# Patient Record
Sex: FEMALE | Race: BLACK OR AFRICAN AMERICAN | NOT HISPANIC OR LATINO | Employment: FULL TIME | URBAN - METROPOLITAN AREA
[De-identification: names, ages, dates, MRNs, and addresses within clinical notes are randomized per-mention and may not be internally consistent; named-entity substitution may affect disease eponyms.]

---

## 2017-09-20 ENCOUNTER — OFFICE VISIT (OUTPATIENT)
Dept: URGENT CARE | Facility: CLINIC | Age: 42
End: 2017-09-20
Payer: COMMERCIAL

## 2017-09-20 VITALS
HEART RATE: 80 BPM | DIASTOLIC BLOOD PRESSURE: 71 MMHG | OXYGEN SATURATION: 100 % | HEIGHT: 72 IN | BODY MASS INDEX: 29.8 KG/M2 | TEMPERATURE: 98 F | SYSTOLIC BLOOD PRESSURE: 111 MMHG | WEIGHT: 220 LBS | RESPIRATION RATE: 20 BRPM

## 2017-09-20 DIAGNOSIS — R04.2 HEMOPTYSIS, UNSPECIFIED: Primary | ICD-10-CM

## 2017-09-20 DIAGNOSIS — J01.00 ACUTE MAXILLARY SINUSITIS, RECURRENCE NOT SPECIFIED: ICD-10-CM

## 2017-09-20 PROCEDURE — 96372 THER/PROPH/DIAG INJ SC/IM: CPT | Mod: S$GLB,,, | Performed by: EMERGENCY MEDICINE

## 2017-09-20 PROCEDURE — 3008F BODY MASS INDEX DOCD: CPT | Mod: S$GLB,,, | Performed by: EMERGENCY MEDICINE

## 2017-09-20 PROCEDURE — 99203 OFFICE O/P NEW LOW 30 MIN: CPT | Mod: S$GLB,,, | Performed by: EMERGENCY MEDICINE

## 2017-09-20 RX ORDER — FLUCONAZOLE 150 MG/1
TABLET ORAL
Qty: 2 TABLET | Refills: 0 | Status: SHIPPED | OUTPATIENT
Start: 2017-09-20 | End: 2021-05-31 | Stop reason: ALTCHOICE

## 2017-09-20 RX ORDER — BETAMETHASONE SODIUM PHOSPHATE AND BETAMETHASONE ACETATE 3; 3 MG/ML; MG/ML
6 INJECTION, SUSPENSION INTRA-ARTICULAR; INTRALESIONAL; INTRAMUSCULAR; SOFT TISSUE ONCE
Status: COMPLETED | OUTPATIENT
Start: 2017-09-20 | End: 2017-09-20

## 2017-09-20 RX ORDER — CODEINE PHOSPHATE AND GUAIFENESIN 10; 100 MG/5ML; MG/5ML
10 SOLUTION ORAL EVERY 6 HOURS PRN
Qty: 120 ML | Refills: 0 | Status: SHIPPED | OUTPATIENT
Start: 2017-09-20 | End: 2017-09-30

## 2017-09-20 RX ORDER — VALACYCLOVIR HYDROCHLORIDE 1 G/1
1000 TABLET, FILM COATED ORAL 2 TIMES DAILY
COMMUNITY

## 2017-09-20 RX ORDER — AMOXICILLIN AND CLAVULANATE POTASSIUM 875; 125 MG/1; MG/1
1 TABLET, FILM COATED ORAL EVERY 12 HOURS
Qty: 20 TABLET | Refills: 0 | Status: SHIPPED | OUTPATIENT
Start: 2017-09-20 | End: 2017-09-30

## 2017-09-20 RX ORDER — BENZONATATE 200 MG/1
200 CAPSULE ORAL 3 TIMES DAILY PRN
Qty: 30 CAPSULE | Refills: 0 | Status: SHIPPED | OUTPATIENT
Start: 2017-09-20 | End: 2017-09-30

## 2017-09-20 RX ADMIN — BETAMETHASONE SODIUM PHOSPHATE AND BETAMETHASONE ACETATE 6 MG: 3; 3 INJECTION, SUSPENSION INTRA-ARTICULAR; INTRALESIONAL; INTRAMUSCULAR; SOFT TISSUE at 02:09

## 2017-09-20 NOTE — PATIENT INSTRUCTIONS
"                                                          Sinusitis   If your condition worsens or fails to improve we recommend that you receive another evaluation at the ER immediately or contact your PCP to discuss your concerns or return here. You must understand that you've received an urgent care treatment only and that you may be released before all your medical problems are known or treated. You the patient will arrange for followup care as instructed.   If we discussed that I think your illness is viral it will not respond to antibiotics and it will last 10-14 days. However, if over the next few days the symptoms worsen start the antibiotics I have given you.   If we discussed that you require antibiotics start them now and take them to completion.   If you are female and on BCP and do take the antibiotics, use additional methods to prevent pregnancy while on the antibiotics and for one cycle after.   Flonase (fluticasone) is a nasal spray which is available over the counter and may help with your symptoms   Zyrtec D, Claritin D or allegra D can also help with symptoms of congestion and drainage.   If you have hypertension avoid using the "D" which is the decongestant   If you just have drainage you can take plain zyrtec, claritin or allegra   If you just have a congested feeling you can take pseudoephedrine (unless you have high blood pressure) which you have to sign for behind the counter. Do not buy the phenylephrine which is on the shelf as it is not effective   Rest and fluids are also important.   Tylenol or ibuprofen can also be used as directed for pain unless you have an allergy to them or medical condition such as stomach ulcers, kidney or liver disease or blood thinners etc for which you should not be taking these type of medications.   If you are flying in the next few days Afrin nose drops for the airplane flight upon take off and landing may help. Other than at those times refrain from using " afrin.   If you were prescribed a narcotic do not drive or operate heavy machinery while taking these medications.       I think the blood you saw was just from the severity of the coughing and irritation in the back of your throat. If you should start coughing increase amounts of blood or have trouble breathing or high fever you need to be rechecked immediately

## 2017-09-20 NOTE — PROGRESS NOTES
"Subjective:       Patient ID: Nadeen Lindsey is a 41 y.o. female.    Vitals:  height is 6' 2.5" (1.892 m) and weight is 99.8 kg (220 lb). Her oral temperature is 98 °F (36.7 °C). Her blood pressure is 111/71 and her pulse is 80. Her respiration is 20 and oxygen saturation is 100%.     Chief Complaint: Cough    MS Lindsey comes to the clinic with complaint of bloody sputum x 1 episode this AM.  She has had productive cough, sore throat, congested sinuses and ear congestion for over 1 week and had been feeling better, but experienced severe coughing last PM that disrupted sleep.  This AM she noticed scant amounts of ronni blood in her mucus when she coughed.  She has taken Mucinex D over the last week as well as acetaminophen and Zyrtec with moderate relief but had stopped taking the Mucinex D yesterday.    She denies any sick contacts (works from home), denies any travel to other countries or contact with others that may have traveled.  She is a non-smoker. She denies any history of exposure to TB, asbestos or other factors that may contribute to lung disorder.  Pt seen and evaluated by me Dr. Tolliver. She has had bad paroxysms of coughing periodically over the past week. She does have posttussive vomiting. No fever. It is mainly the sinus drainage. The amount of blood was minimal but was bright red. Pt in no resp distress      Cough   This is a new problem. The current episode started 1 to 4 weeks ago. The problem has been gradually worsening. Episode frequency: when eating and drinking. The cough is productive of bloody sputum. Associated symptoms include hemoptysis (slight amount of bright red blood). Pertinent negatives include no chest pain, chills, ear pain, eye redness, fever, headaches, myalgias, sore throat, shortness of breath or wheezing. The symptoms are aggravated by other. She has tried OTC cough suppressant (mucinex) for the symptoms. The treatment provided mild relief.     Review of Systems "   Constitution: Positive for malaise/fatigue. Negative for chills and fever.   HENT: Positive for congestion. Negative for ear pain, hoarse voice, nosebleeds and sore throat.    Eyes: Negative for discharge and redness.   Cardiovascular: Negative for chest pain, dyspnea on exertion and leg swelling.   Respiratory: Positive for cough, hemoptysis (slight amount of bright red blood) and sputum production. Negative for shortness of breath and wheezing.    Musculoskeletal: Negative for myalgias.   Gastrointestinal: Positive for vomiting (post tussive only). Negative for abdominal pain, dysphagia, melena and nausea.   Neurological: Negative for headaches.       Objective:      Physical Exam   Constitutional: She is oriented to person, place, and time. She appears well-developed and well-nourished. She is cooperative.  Non-toxic appearance. She does not appear ill. No distress.   HENT:   Head: Normocephalic and atraumatic.   Right Ear: Hearing, tympanic membrane, external ear and ear canal normal.   Left Ear: Hearing, external ear and ear canal normal. Tympanic membrane is retracted.  No middle ear effusion.   Nose: Mucosal edema and rhinorrhea present. No nasal deformity. No epistaxis. Right sinus exhibits no maxillary sinus tenderness and no frontal sinus tenderness. Left sinus exhibits no maxillary sinus tenderness and no frontal sinus tenderness.   Mouth/Throat: Uvula is midline and mucous membranes are normal. No trismus in the jaw. Normal dentition. No uvula swelling. Posterior oropharyngeal erythema present.   Eyes: Conjunctivae, EOM and lids are normal. Pupils are equal, round, and reactive to light. No scleral icterus.   Sclera clear bilat   Neck: Trachea normal, normal range of motion, full passive range of motion without pain and phonation normal. Neck supple.   Cardiovascular: Normal rate, regular rhythm, normal heart sounds, intact distal pulses and normal pulses.    Pulmonary/Chest: Effort normal and breath  sounds normal. No respiratory distress. She has no wheezes. She has no rhonchi. She has no rales.   Abdominal: Soft. Normal appearance and bowel sounds are normal. She exhibits no distension. There is no tenderness.   Musculoskeletal: Normal range of motion. She exhibits no edema or deformity.   Neurological: She is alert and oriented to person, place, and time. She exhibits normal muscle tone. Coordination normal.   Skin: Skin is warm, dry and intact. She is not diaphoretic. No pallor.   Psychiatric: She has a normal mood and affect. Her speech is normal and behavior is normal. Judgment and thought content normal. Cognition and memory are normal.   Nursing note and vitals reviewed.    CXR negative and radiologist confirm and pt informed  Assessment:       1. Hemoptysis, unspecified    2. Acute maxillary sinusitis, recurrence not specified        Plan:         Hemoptysis, unspecified  -     X-Ray Chest PA And Lateral; Future; Expected date: 09/20/2017    Acute maxillary sinusitis, recurrence not specified    Other orders  -     amoxicillin-clavulanate 875-125mg (AUGMENTIN) 875-125 mg per tablet; Take 1 tablet by mouth every 12 (twelve) hours. For 10 days  Dispense: 20 tablet; Refill: 0  -     benzonatate (TESSALON) 200 MG capsule; Take 1 capsule (200 mg total) by mouth 3 (three) times daily as needed for Cough.  Dispense: 30 capsule; Refill: 0  -     guaifenesin-codeine 100-10 mg/5 ml (TUSSI-ORGANIDIN NR)  mg/5 mL syrup; Take 10 mLs by mouth every 6 (six) hours as needed for Cough.  Dispense: 120 mL; Refill: 0  -     betamethasone acetate-betamethasone sodium phosphate injection 6 mg; Inject 1 mL (6 mg total) into the muscle once.  -     fluconazole (DIFLUCAN) 150 MG Tab; One po on day one and another tablet on day 3  Dispense: 2 tablet; Refill: 0

## 2017-09-22 ENCOUNTER — TELEPHONE (OUTPATIENT)
Dept: URGENT CARE | Facility: CLINIC | Age: 42
End: 2017-09-22

## 2017-09-22 NOTE — TELEPHONE ENCOUNTER
----- Message from Lucia Frausto sent at 9/22/2017 10:42 AM CDT -----  Contact: Patient  Patient called to notify doctor of diarrhea. She was here on 09/20/2017. She thinks its from taking her prescribed meds but is not sure, and wants to talk to the doctor.    Per Dr. Tolliver: I called and spoke with pt. Pt was put on Augmentin 2 days ago for cough with hemoptysis. She has had 4 watery BMS since yesterday and read that the medication can cause this. No vomiting No fever No blood in the stool She states she feels much better and no longer coughing. I advised her to stop the medication. She does have cough med with codeine. She can use immodium. I told her I would hold off on an more antibiotics for now but if her symptoms change to call me. I also told her to get some immodium.

## 2018-02-11 ENCOUNTER — OFFICE VISIT (OUTPATIENT)
Dept: URGENT CARE | Facility: CLINIC | Age: 43
End: 2018-02-11
Payer: COMMERCIAL

## 2018-02-11 VITALS
BODY MASS INDEX: 29.8 KG/M2 | SYSTOLIC BLOOD PRESSURE: 156 MMHG | DIASTOLIC BLOOD PRESSURE: 86 MMHG | RESPIRATION RATE: 19 BRPM | WEIGHT: 220 LBS | HEART RATE: 100 BPM | OXYGEN SATURATION: 100 % | TEMPERATURE: 98 F | HEIGHT: 72 IN

## 2018-02-11 DIAGNOSIS — Z88.9 HISTORY OF ALLERGY: ICD-10-CM

## 2018-02-11 DIAGNOSIS — R05.9 COUGH: ICD-10-CM

## 2018-02-11 DIAGNOSIS — J06.9 UPPER RESPIRATORY TRACT INFECTION, UNSPECIFIED TYPE: Primary | ICD-10-CM

## 2018-02-11 LAB
CTP QC/QA: YES
FLUAV AG NPH QL: NEGATIVE
FLUBV AG NPH QL: NEGATIVE

## 2018-02-11 PROCEDURE — 99214 OFFICE O/P EST MOD 30 MIN: CPT | Mod: S$GLB,,, | Performed by: NURSE PRACTITIONER

## 2018-02-11 PROCEDURE — 87804 INFLUENZA ASSAY W/OPTIC: CPT | Mod: 59,QW,S$GLB, | Performed by: NURSE PRACTITIONER

## 2018-02-11 PROCEDURE — 3008F BODY MASS INDEX DOCD: CPT | Mod: S$GLB,,, | Performed by: NURSE PRACTITIONER

## 2018-02-11 RX ORDER — FLUTICASONE PROPIONATE 50 MCG
1 SPRAY, SUSPENSION (ML) NASAL 2 TIMES DAILY
Qty: 1 BOTTLE | Refills: 3 | Status: SHIPPED | OUTPATIENT
Start: 2018-02-11

## 2018-02-11 NOTE — PATIENT INSTRUCTIONS
You may continue taking Tylenol (acetaminophen) or ibuprofen for pain and fever.    You may continue taking your prescribed cough syrup at night to aid sleep.    Viral Upper Respiratory Illness (Adult)   You have a viral upper respiratory illness (URI), which is another term for the common cold. This illness is contagious during the first few days. It is spread through the air by coughing and sneezing. It may also be spread by direct contact (touching the sick person and then touching your own eyes, nose, or mouth). Frequent handwashing will decrease risk of spread. Most viral illnesses go away within 7 to 10 days with rest and simple home remedies. Sometimes the illness may last for several weeks. Antibiotics will not kill a virus, and they are generally not prescribed for this condition.    Home care  · If symptoms are severe, rest at home for the first 2 to 3 days. When you resume activity, don't let yourself get too tired.  · Avoid being exposed to cigarette smoke (yours or others).  · You may use acetaminophen or ibuprofen to control pain and fever, unless another medicine was prescribed. (Note: If you have chronic liver or kidney disease, have ever had a stomach ulcer or gastrointestinal bleeding, or are taking blood-thinning medicines, talk with your healthcare provider before using these medicines.) Aspirin should never be given to anyone under 18 years of age who is ill with a viral infection or fever. It may cause severe liver or brain damage.  · Your appetite may be poor, so a light diet is fine. Avoid dehydration by drinking 6 to 8 glasses of fluids per day (water, soft drinks, juices, tea, or soup). Extra fluids will help loosen secretions in the nose and lungs.  · Over-the-counter cold medicines will not shorten the length of time youre sick, but they may be helpful for the following symptoms: cough, sore throat, and nasal and sinus congestion. (Note: Do not use decongestants if you have high blood  pressure.)  Follow-up care  Follow up with your healthcare provider, or as advised.  When to seek medical advice  Call your healthcare provider right away if any of these occur:  · Cough with lots of colored sputum (mucus)  · Severe headache; face, neck, or ear pain  · Difficulty swallowing due to throat pain  · Fever of 100.4°F (38°C)  Call 911, or get immediate medical care  Call emergency services right away if any of these occur:  · Chest pain, shortness of breath, wheezing, or difficulty breathing  · Coughing up blood  · Inability to swallow due to throat pain  Date Last Reviewed: 9/13/2015  © 6103-8320 [employer]. [address] [place] [address], [place] [address]. All rights reserved. This information is not intended as a substitute for professional medical care. Always follow your healthcare professional's instructions.

## 2018-02-11 NOTE — PROGRESS NOTES
"Subjective:       Patient ID: Nadeen Lindsey is a 42 y.o. female.    Vitals:  height is 6' 2" (1.88 m) and weight is 99.8 kg (220 lb). Her oral temperature is 98.4 °F (36.9 °C). Her blood pressure is 156/86 (abnormal) and her pulse is 100. Her respiration is 19 and oxygen saturation is 100%.     Chief Complaint: Cough    Patient with cough x 2 days. Cough started Friday night while patient was at the [employer]. Patient stating when she got home, her cough got worse. She also developed a sore throat. Patient had a left over rx of Cheratussin. Cheratussin helped with cough. Patient then developed chills, fever, and body aches. Patient with temperature of 100.4 F at home.      Cough   This is a new problem. Episode onset: 2 days. The problem has been gradually worsening. The problem occurs every few minutes. Associated symptoms include chills, a fever and a sore throat. Pertinent negatives include no chest pain, ear pain, eye redness, headaches, myalgias, shortness of breath or wheezing.     Review of Systems   Constitution: Positive for chills, fever and malaise/fatigue.   HENT: Positive for congestion, hoarse voice and sore throat. Negative for ear pain.    Eyes: Negative for discharge and redness.   Cardiovascular: Negative for chest pain, dyspnea on exertion and leg swelling.   Respiratory: Positive for cough. Negative for shortness of breath, sputum production and wheezing.    Musculoskeletal: Negative for myalgias.   Gastrointestinal: Negative for abdominal pain and nausea.   Neurological: Negative for headaches.       Objective:      Physical Exam   Constitutional: She is oriented to person, place, and time. She appears well-developed and well-nourished. She is cooperative.  Non-toxic appearance. She does not appear ill. No distress.   HENT:   Head: Normocephalic and atraumatic.   Right Ear: Hearing, tympanic membrane, external ear and ear canal normal.   Left Ear: Hearing, tympanic membrane, external ear and ear " canal normal.   Nose: Rhinorrhea present. No mucosal edema or nasal deformity. No epistaxis. Right sinus exhibits no maxillary sinus tenderness and no frontal sinus tenderness. Left sinus exhibits no maxillary sinus tenderness and no frontal sinus tenderness.   Mouth/Throat: Uvula is midline and mucous membranes are normal. No trismus in the jaw. Normal dentition. No uvula swelling. Posterior oropharyngeal erythema present.   Eyes: Conjunctivae and lids are normal. No scleral icterus.   Sclera clear bilat   Neck: Trachea normal, full passive range of motion without pain and phonation normal. Neck supple.   Cardiovascular: Normal rate, regular rhythm, normal heart sounds, intact distal pulses and normal pulses.    Pulmonary/Chest: Effort normal. No respiratory distress. She has no wheezes. She has no rhonchi. She has no rales.   Abdominal: Soft. Normal appearance and bowel sounds are normal. She exhibits no distension. There is no tenderness.   Musculoskeletal: Normal range of motion. She exhibits no edema or deformity.   Neurological: She is alert and oriented to person, place, and time. She exhibits normal muscle tone. Coordination normal.   Skin: Skin is warm, dry and intact. She is not diaphoretic. No pallor.   Psychiatric: She has a normal mood and affect. Her speech is normal and behavior is normal. Judgment and thought content normal. Cognition and memory are normal.   Nursing note and vitals reviewed.        POCT Influenza A/B   Order: [id number]   Status:  Final result   Visible to patient:  No (Not Released)   Next appt:  None   Dx:  Cough    Ref Range & Units 12:14   Rapid Influenza A Ag Negative Negative    Rapid Influenza B Ag Negative Negative     Acceptable  Yes    Resulting Agency  [facility]             Assessment:       1. Upper respiratory tract infection, unspecified type    2. Cough    3. History of allergy        Plan:         Upper respiratory tract infection, unspecified type  -      fluticasone (FLONASE) 50 mcg/actuation nasal spray; 1 spray (50 mcg total) by Each Nare route 2 (two) times daily.  Dispense: 1 Bottle; Refill: 3    Cough  -     POCT Influenza A/B    History of allergy  -     fluticasone (FLONASE) 50 mcg/actuation nasal spray; 1 spray (50 mcg total) by Each Nare route 2 (two) times daily.  Dispense: 1 Bottle; Refill: 3      Patient Instructions     You may continue taking Tylenol (acetaminophen) or ibuprofen for pain and fever.    You may continue taking your prescribed cough syrup at night to aid sleep.    Viral Upper Respiratory Illness (Adult)   You have a viral upper respiratory illness (URI), which is another term for the common cold. This illness is contagious during the first few days. It is spread through the air by coughing and sneezing. It may also be spread by direct contact (touching the sick person and then touching your own eyes, nose, or mouth). Frequent handwashing will decrease risk of spread. Most viral illnesses go away within 7 to 10 days with rest and simple home remedies. Sometimes the illness may last for several weeks. Antibiotics will not kill a virus, and they are generally not prescribed for this condition.    Home care  · If symptoms are severe, rest at home for the first 2 to 3 days. When you resume activity, don't let yourself get too tired.  · Avoid being exposed to cigarette smoke (yours or others).  · You may use acetaminophen or ibuprofen to control pain and fever, unless another medicine was prescribed. (Note: If you have chronic liver or kidney disease, have ever had a stomach ulcer or gastrointestinal bleeding, or are taking blood-thinning medicines, talk with your healthcare provider before using these medicines.) Aspirin should never be given to anyone under 18 years of age who is ill with a viral infection or fever. It may cause severe liver or brain damage.  · Your appetite may be poor, so a light diet is fine. Avoid dehydration by drinking  6 to 8 glasses of fluids per day (water, soft drinks, juices, tea, or soup). Extra fluids will help loosen secretions in the nose and lungs.  · Over-the-counter cold medicines will not shorten the length of time youre sick, but they may be helpful for the following symptoms: cough, sore throat, and nasal and sinus congestion. (Note: Do not use decongestants if you have high blood pressure.)  Follow-up care  Follow up with your healthcare provider, or as advised.  When to seek medical advice  Call your healthcare provider right away if any of these occur:  · Cough with lots of colored sputum (mucus)  · Severe headache; face, neck, or ear pain  · Difficulty swallowing due to throat pain  · Fever of 100.4°F (38°C)  Call 911, or get immediate medical care  Call emergency services right away if any of these occur:  · Chest pain, shortness of breath, wheezing, or difficulty breathing  · Coughing up blood  · Inability to swallow due to throat pain  Date Last Reviewed: 9/13/2015  © 3425-2040 [employer] [address] [place] [address], [place] [address]. All rights reserved. This information is not intended as a substitute for professional medical care. Always follow your healthcare professional's instructions.

## 2018-06-18 DIAGNOSIS — Z88.9 HISTORY OF ALLERGY: ICD-10-CM

## 2018-06-18 DIAGNOSIS — J06.9 UPPER RESPIRATORY TRACT INFECTION, UNSPECIFIED TYPE: ICD-10-CM

## 2018-06-20 RX ORDER — FLUTICASONE PROPIONATE 50 MCG
SPRAY, SUSPENSION (ML) NASAL
Qty: 16 ML | Refills: 3 | OUTPATIENT
Start: 2018-06-20

## 2018-07-03 DIAGNOSIS — J06.9 UPPER RESPIRATORY TRACT INFECTION, UNSPECIFIED TYPE: ICD-10-CM

## 2018-07-03 DIAGNOSIS — Z88.9 HISTORY OF ALLERGY: ICD-10-CM

## 2018-07-05 RX ORDER — FLUTICASONE PROPIONATE 50 MCG
SPRAY, SUSPENSION (ML) NASAL
Qty: 16 ML | Refills: 3 | OUTPATIENT
Start: 2018-07-05

## 2018-07-10 DIAGNOSIS — J06.9 UPPER RESPIRATORY TRACT INFECTION, UNSPECIFIED TYPE: ICD-10-CM

## 2018-07-10 DIAGNOSIS — Z88.9 HISTORY OF ALLERGY: ICD-10-CM

## 2018-07-10 RX ORDER — FLUTICASONE PROPIONATE 50 MCG
SPRAY, SUSPENSION (ML) NASAL
Qty: 16 ML | Refills: 3 | OUTPATIENT
Start: 2018-07-10

## 2018-10-02 DIAGNOSIS — J06.9 UPPER RESPIRATORY TRACT INFECTION, UNSPECIFIED TYPE: ICD-10-CM

## 2018-10-02 DIAGNOSIS — Z88.9 HISTORY OF ALLERGY: ICD-10-CM

## 2018-10-04 RX ORDER — FLUTICASONE PROPIONATE 50 MCG
SPRAY, SUSPENSION (ML) NASAL
Qty: 16 ML | Refills: 3 | OUTPATIENT
Start: 2018-10-04

## 2018-10-31 DIAGNOSIS — Z88.9 HISTORY OF ALLERGY: ICD-10-CM

## 2018-10-31 DIAGNOSIS — J06.9 UPPER RESPIRATORY TRACT INFECTION, UNSPECIFIED TYPE: ICD-10-CM

## 2018-11-01 RX ORDER — FLUTICASONE PROPIONATE 50 MCG
SPRAY, SUSPENSION (ML) NASAL
Qty: 16 ML | Refills: 3 | OUTPATIENT
Start: 2018-11-01

## 2021-05-31 ENCOUNTER — OFFICE VISIT (OUTPATIENT)
Dept: URGENT CARE | Facility: CLINIC | Age: 46
End: 2021-05-31
Payer: COMMERCIAL

## 2021-05-31 VITALS
TEMPERATURE: 98 F | BODY MASS INDEX: 30.48 KG/M2 | WEIGHT: 225 LBS | HEART RATE: 100 BPM | HEIGHT: 72 IN | DIASTOLIC BLOOD PRESSURE: 88 MMHG | OXYGEN SATURATION: 99 % | RESPIRATION RATE: 20 BRPM | SYSTOLIC BLOOD PRESSURE: 135 MMHG

## 2021-05-31 DIAGNOSIS — L73.1 INGROWN HAIR: Primary | ICD-10-CM

## 2021-05-31 PROCEDURE — 3008F PR BODY MASS INDEX (BMI) DOCUMENTED: ICD-10-PCS | Mod: CPTII,S$GLB,, | Performed by: FAMILY MEDICINE

## 2021-05-31 PROCEDURE — 99213 OFFICE O/P EST LOW 20 MIN: CPT | Mod: S$GLB,,, | Performed by: FAMILY MEDICINE

## 2021-05-31 PROCEDURE — 3008F BODY MASS INDEX DOCD: CPT | Mod: CPTII,S$GLB,, | Performed by: FAMILY MEDICINE

## 2021-05-31 PROCEDURE — 99213 PR OFFICE/OUTPT VISIT, EST, LEVL III, 20-29 MIN: ICD-10-PCS | Mod: S$GLB,,, | Performed by: FAMILY MEDICINE

## 2022-06-10 ENCOUNTER — TELEPHONE (OUTPATIENT)
Dept: OBSTETRICS AND GYNECOLOGY | Facility: CLINIC | Age: 47
End: 2022-06-10
Payer: COMMERCIAL

## 2022-06-10 NOTE — TELEPHONE ENCOUNTER
----- Message from Aureliano Snowden sent at 6/10/2022  8:47 AM CDT -----  Contact: pt  .Type:  Needs Medical Advice    Who Called: pt  Symptoms (please be specific): fibroids  How long has patient had these symptoms:  bleeding   Would the patient rather a call back or a response via [facility]? Call back  Best Call Back Number: [phone]  Additional Information: Pt. States she would told to see Dr. Ley because of fibroids per Dr. Meraz.  Her problems is  not getting any better.

## 2022-06-15 ENCOUNTER — OFFICE VISIT (OUTPATIENT)
Dept: URGENT CARE | Facility: CLINIC | Age: 47
End: 2022-06-15
Payer: COMMERCIAL

## 2022-06-15 VITALS
TEMPERATURE: 99 F | OXYGEN SATURATION: 96 % | HEART RATE: 105 BPM | BODY MASS INDEX: 30.48 KG/M2 | SYSTOLIC BLOOD PRESSURE: 136 MMHG | WEIGHT: 225 LBS | DIASTOLIC BLOOD PRESSURE: 59 MMHG | HEIGHT: 72 IN | RESPIRATION RATE: 20 BRPM

## 2022-06-15 DIAGNOSIS — T36.95XA ANTIBIOTIC REACTION: ICD-10-CM

## 2022-06-15 DIAGNOSIS — N76.0 ACUTE VAGINITIS: ICD-10-CM

## 2022-06-15 DIAGNOSIS — L03.012 CELLULITIS OF LEFT INDEX FINGER: Primary | ICD-10-CM

## 2022-06-15 PROCEDURE — 3078F PR MOST RECENT DIASTOLIC BLOOD PRESSURE < 80 MM HG: ICD-10-PCS | Mod: CPTII,S$GLB,, | Performed by: NURSE PRACTITIONER

## 2022-06-15 PROCEDURE — 3008F PR BODY MASS INDEX (BMI) DOCUMENTED: ICD-10-PCS | Mod: CPTII,S$GLB,, | Performed by: NURSE PRACTITIONER

## 2022-06-15 PROCEDURE — 1160F PR REVIEW ALL MEDS BY PRESCRIBER/CLIN PHARMACIST DOCUMENTED: ICD-10-PCS | Mod: CPTII,S$GLB,, | Performed by: NURSE PRACTITIONER

## 2022-06-15 PROCEDURE — 1160F RVW MEDS BY RX/DR IN RCRD: CPT | Mod: CPTII,S$GLB,, | Performed by: NURSE PRACTITIONER

## 2022-06-15 PROCEDURE — 3008F BODY MASS INDEX DOCD: CPT | Mod: CPTII,S$GLB,, | Performed by: NURSE PRACTITIONER

## 2022-06-15 PROCEDURE — 3075F PR MOST RECENT SYSTOLIC BLOOD PRESS GE 130-139MM HG: ICD-10-PCS | Mod: CPTII,S$GLB,, | Performed by: NURSE PRACTITIONER

## 2022-06-15 PROCEDURE — 99213 OFFICE O/P EST LOW 20 MIN: CPT | Mod: S$GLB,,, | Performed by: NURSE PRACTITIONER

## 2022-06-15 PROCEDURE — 3078F DIAST BP <80 MM HG: CPT | Mod: CPTII,S$GLB,, | Performed by: NURSE PRACTITIONER

## 2022-06-15 PROCEDURE — 99213 PR OFFICE/OUTPT VISIT, EST, LEVL III, 20-29 MIN: ICD-10-PCS | Mod: S$GLB,,, | Performed by: NURSE PRACTITIONER

## 2022-06-15 PROCEDURE — 1159F PR MEDICATION LIST DOCUMENTED IN MEDICAL RECORD: ICD-10-PCS | Mod: CPTII,S$GLB,, | Performed by: NURSE PRACTITIONER

## 2022-06-15 PROCEDURE — 1159F MED LIST DOCD IN RCRD: CPT | Mod: CPTII,S$GLB,, | Performed by: NURSE PRACTITIONER

## 2022-06-15 PROCEDURE — 3075F SYST BP GE 130 - 139MM HG: CPT | Mod: CPTII,S$GLB,, | Performed by: NURSE PRACTITIONER

## 2022-06-15 RX ORDER — MUPIROCIN 20 MG/G
OINTMENT TOPICAL 3 TIMES DAILY PRN
Qty: 22 G | Refills: 0 | Status: SHIPPED | OUTPATIENT
Start: 2022-06-15

## 2022-06-15 RX ORDER — AMOXICILLIN 875 MG/1
875 TABLET, FILM COATED ORAL 2 TIMES DAILY
COMMUNITY
Start: 2022-06-14

## 2022-06-15 RX ORDER — FLUCONAZOLE 150 MG/1
150 TABLET ORAL DAILY
Qty: 1 TABLET | Refills: 0 | Status: SHIPPED | OUTPATIENT
Start: 2022-06-15 | End: 2022-06-16

## 2022-06-15 NOTE — PROGRESS NOTES
"Subjective:       Patient ID: Nadeen Lindsey is a 46 y.o. female.    Vitals:  height is 6' 2.5" (1.892 m) and weight is 102.1 kg (225 lb). Her temperature is 98.5 °F (36.9 °C). Her blood pressure is 136/59 (abnormal) and her pulse is 105. Her respiration is 20 and oxygen saturation is 96%.     Chief Complaint: Finger Problem     Pt reports that the onset was on Memorial Day. Pt reports that her purse slipped off her shoulder and hung on her nail. Pt reports that she got the acrylic off the nail and it was okay. Pt reports that the finger now feels like its on fire. Pt reports that the pain is on the left index finger. Pt reports that she does not see anything with the finger. Pt reports that she can not  anything and straighten the finger. Pt reports that she had a virtual visit and was told to soak the acrylic off the nail .     46 year old female presents to clinic requesting evaluation of swollen, tender left index finger. She reports [facility] treated for cellulitis of left finger and prescribed amoxicillin 875 mg tablets. Never retrieved prescription from pharmacy. She reports an injury to her index finger 2 weeks ago with a nail repair at a salon involving an acrylic wrap with ongoing finger pain. She has been soaking her nail and removed the acrylic and is here for evaluation.           Hand Pain   The incident occurred 2 days ago. The incident occurred at home. There was no injury mechanism. The pain is at a severity of 10/10. The pain is severe. The pain has been constant since the incident. Pertinent negatives include no chest pain, muscle weakness, numbness or tingling. Nothing aggravates the symptoms. Treatments tried: tylenol  The treatment provided mild relief.       Constitution: Negative for chills and fever.   Cardiovascular: Negative for chest pain.   Skin: Positive for color change and erythema. Negative for abrasion, laceration, lesion, skin " thickening/induration, bruising, abscess and avulsion.   Neurological: Negative for numbness.       Objective:      Physical Exam   Constitutional: She is oriented to person, place, and time. She appears well-developed.   HENT:   Head: Normocephalic and atraumatic. Head is without abrasion, without contusion and without laceration.   Ears:   Right Ear: External ear normal.   Left Ear: External ear normal.   Nose: Nose normal.   Mouth/Throat: Oropharynx is clear and moist and mucous membranes are normal.   Eyes: Conjunctivae, EOM and lids are normal. Pupils are equal, round, and reactive to light.   Neck: Trachea normal and phonation normal. Neck supple.   Cardiovascular: Normal rate.   Pulmonary/Chest: Effort normal. No respiratory distress.   Musculoskeletal: Normal range of motion.         General: Normal range of motion.   Neurological: She is alert and oriented to person, place, and time.   Skin: Skin is warm, dry, intact and no rash. Capillary refill takes less than 2 seconds. erythema No abrasion, No burn, No bruising and No ecchymosis No clubbing and No cyanosis         Psychiatric: Her speech is normal and behavior is normal. Judgment and thought content normal.   Nursing note and vitals reviewed.        Assessment:       1. Cellulitis of left index finger    2. Acute vaginitis    3. Antibiotic reaction          Plan:         Cellulitis of left index finger    Acute vaginitis  -     fluconazole (DIFLUCAN) 150 MG Tab; Take 1 tablet (150 mg total) by mouth once daily. for 1 day  Dispense: 1 tablet; Refill: 0    Antibiotic reaction  -     fluconazole (DIFLUCAN) 150 MG Tab; Take 1 tablet (150 mg total) by mouth once daily. for 1 day  Dispense: 1 tablet; Refill: 0    patient called requesting Rx for topical antibiotic ointment instead of OTC purchase, Rx sent     Patient Instructions   · Treat the infection. Finish all of the antibiotics, even if the infection appears to have gone away.  · Help with pain and  swelling    Please return here or go to the Emergency Department for any concerns or worsening of condition.    If you were prescribed antibiotics, please take them to completion.    Please follow up with your primary care doctor or specialist as needed.    If you  smoke, please stop smoking.

## 2022-06-15 NOTE — PATIENT INSTRUCTIONS
Treat the infection. Finish all of the antibiotics, even if the infection appears to have gone away.  Help with pain and swelling    Please return here or go to the Emergency Department for any concerns or worsening of condition.    If you were prescribed antibiotics, please take them to completion.    Please follow up with your primary care doctor or specialist as needed.    If you  smoke, please stop smoking.

## 2022-06-19 ENCOUNTER — TELEPHONE (OUTPATIENT)
Dept: URGENT CARE | Facility: CLINIC | Age: 47
End: 2022-06-19
Payer: COMMERCIAL

## 2022-06-19 NOTE — TELEPHONE ENCOUNTER
Called patient to due a follow up from her visit on 06/15/2022 and was unable to leave a message for her

## 2022-06-19 NOTE — TELEPHONE ENCOUNTER
Patient called back to let us know that she now has a white line in the center of her nail bed . That she was unsure if it was from the topical cream or the antibiotics and or if it was part of the healing process.  She did say that her finger was doing much better where the pain origin  Area just concern about the white line on nail bed.

## 2022-10-18 ENCOUNTER — HOSPITAL ENCOUNTER (EMERGENCY)
Facility: OTHER | Age: 47
Discharge: HOME | End: 2022-10-18
Attending: EMERGENCY MEDICINE
Payer: COMMERCIAL

## 2022-10-18 VITALS
SYSTOLIC BLOOD PRESSURE: 125 MMHG | HEIGHT: 72 IN | TEMPERATURE: 98 F | BODY MASS INDEX: 33.86 KG/M2 | WEIGHT: 250 LBS | HEART RATE: 76 BPM | DIASTOLIC BLOOD PRESSURE: 68 MMHG | RESPIRATION RATE: 18 BRPM | OXYGEN SATURATION: 99 %

## 2022-10-18 DIAGNOSIS — N92.0 MENORRHAGIA WITH REGULAR CYCLE: Primary | ICD-10-CM

## 2022-10-18 LAB
ALBUMIN SERPL BCP-MCNC: 4.1 G/DL (ref 3.5–5.2)
ALP SERPL-CCNC: 94 U/L (ref 55–135)
ALT SERPL W/O P-5'-P-CCNC: 9 U/L (ref 10–44)
ANION GAP SERPL CALC-SCNC: 13 MMOL/L (ref 8–16)
APTT BLDCRRT: 30.4 SEC (ref 21–32)
AST SERPL-CCNC: 19 U/L (ref 10–40)
B-HCG UR QL: NEGATIVE
BASOPHILS # BLD AUTO: 0.01 K/UL (ref 0–0.2)
BASOPHILS NFR BLD: 0.1 % (ref 0–1.9)
BILIRUB SERPL-MCNC: 0.3 MG/DL (ref 0.1–1)
BILIRUB UR QL STRIP: NEGATIVE
BUN SERPL-MCNC: 8 MG/DL (ref 6–20)
CALCIUM SERPL-MCNC: 9.1 MG/DL (ref 8.7–10.5)
CHLORIDE SERPL-SCNC: 105 MMOL/L (ref 95–110)
CLARITY UR: CLEAR
CO2 SERPL-SCNC: 20 MMOL/L (ref 23–29)
COLOR UR: YELLOW
CREAT SERPL-MCNC: 0.8 MG/DL (ref 0.5–1.4)
CTP QC/QA: YES
DIFFERENTIAL METHOD: ABNORMAL
EOSINOPHIL # BLD AUTO: 0.1 K/UL (ref 0–0.5)
EOSINOPHIL NFR BLD: 0.7 % (ref 0–8)
ERYTHROCYTE [DISTWIDTH] IN BLOOD BY AUTOMATED COUNT: 19.3 % (ref 11.5–14.5)
EST. GFR  (NO RACE VARIABLE): >60 ML/MIN/1.73 M^2
GLUCOSE SERPL-MCNC: 98 MG/DL (ref 70–110)
GLUCOSE UR QL STRIP: NEGATIVE
HCT VFR BLD AUTO: 31.7 % (ref 37–48.5)
HGB BLD-MCNC: 8.5 G/DL (ref 12–16)
HGB UR QL STRIP: ABNORMAL
IMM GRANULOCYTES # BLD AUTO: 0.02 K/UL (ref 0–0.04)
IMM GRANULOCYTES NFR BLD AUTO: 0.3 % (ref 0–0.5)
INR PPP: 0.9 (ref 0.8–1.2)
KETONES UR QL STRIP: NEGATIVE
LEUKOCYTE ESTERASE UR QL STRIP: NEGATIVE
LYMPHOCYTES # BLD AUTO: 1.2 K/UL (ref 1–4.8)
LYMPHOCYTES NFR BLD: 16.9 % (ref 18–48)
MCH RBC QN AUTO: 19.4 PG (ref 27–31)
MCHC RBC AUTO-ENTMCNC: 26.8 G/DL (ref 32–36)
MCV RBC AUTO: 72 FL (ref 82–98)
MONOCYTES # BLD AUTO: 0.4 K/UL (ref 0.3–1)
MONOCYTES NFR BLD: 5.3 % (ref 4–15)
NEUTROPHILS # BLD AUTO: 5.3 K/UL (ref 1.8–7.7)
NEUTROPHILS NFR BLD: 76.7 % (ref 38–73)
NITRITE UR QL STRIP: NEGATIVE
NRBC BLD-RTO: 0 /100 WBC
PH UR STRIP: 6 [PH] (ref 5–8)
PLATELET # BLD AUTO: 484 K/UL (ref 150–450)
PMV BLD AUTO: 10.5 FL (ref 9.2–12.9)
POTASSIUM SERPL-SCNC: 4.2 MMOL/L (ref 3.5–5.1)
PROT SERPL-MCNC: 8.4 G/DL (ref 6–8.4)
PROT UR QL STRIP: NEGATIVE
PROTHROMBIN TIME: 10.3 SEC (ref 9–12.5)
RBC # BLD AUTO: 4.38 M/UL (ref 4–5.4)
SODIUM SERPL-SCNC: 138 MMOL/L (ref 136–145)
SP GR UR STRIP: 1.02 (ref 1–1.03)
URN SPEC COLLECT METH UR: ABNORMAL
UROBILINOGEN UR STRIP-ACNC: NEGATIVE EU/DL
WBC # BLD AUTO: 6.92 K/UL (ref 3.9–12.7)

## 2022-10-18 PROCEDURE — 85025 COMPLETE CBC W/AUTO DIFF WBC: CPT | Performed by: NURSE PRACTITIONER

## 2022-10-18 PROCEDURE — 99283 EMERGENCY DEPT VISIT LOW MDM: CPT | Mod: 25

## 2022-10-18 PROCEDURE — 81003 URINALYSIS AUTO W/O SCOPE: CPT | Performed by: EMERGENCY MEDICINE

## 2022-10-18 PROCEDURE — 81025 URINE PREGNANCY TEST: CPT | Performed by: NURSE PRACTITIONER

## 2022-10-18 PROCEDURE — 85730 THROMBOPLASTIN TIME PARTIAL: CPT | Performed by: EMERGENCY MEDICINE

## 2022-10-18 PROCEDURE — 85610 PROTHROMBIN TIME: CPT | Performed by: EMERGENCY MEDICINE

## 2022-10-18 PROCEDURE — 80053 COMPREHEN METABOLIC PANEL: CPT | Performed by: NURSE PRACTITIONER

## 2022-10-18 NOTE — ED PROVIDER NOTES
Encounter Date: 10/18/2022       History     Chief Complaint   Patient presents with    Vaginal Bleeding     Pt c.o vaginal bleeding onset yesterday. Pt started her cycle yesterday.  Pt had hx of fibroid. Pt states bleeding increased this AM. Pt states she is going through a tampon and pad. Pt states she went through a tampon before she even got pad on.  AAO x 3 nadn skin w.d  conjunctiva pink  pt states she feels lightheaded      46-year-old  female with a history of fibroids and heavy menstrual cycles who presents to the ED with reports of increased heaviness or menstrual cycle over the last few days with associated lightheadedness.  She states that she is unable to get and see your gynecologist.  She states that she is changing her tampon and menstrual pads more frequently.  Denies any chest pain, shortness of breath, bleeding from any other source, blood thinner,  or other complaints.    Review of patient's allergies indicates:   Allergen Reactions    Shellfish containing products Anaphylaxis     Past Medical History:   Diagnosis Date    Herpes      Past Surgical History:   Procedure Laterality Date    MYOMECTOMY  2014     Family History   Problem Relation Age of Onset    Hypertension Mother     Hypertension Father     Diabetes Father      Social History     Tobacco Use    Smoking status: Never    Smokeless tobacco: Never   Substance Use Topics    Alcohol use: Yes     Comment: socc    Drug use: No     Review of Systems   Constitutional:  Negative for fever.   HENT:  Negative for sore throat.    Respiratory:  Negative for shortness of breath.    Cardiovascular:  Negative for chest pain.   Gastrointestinal:  Negative for abdominal pain, nausea and vomiting.   Genitourinary:  Positive for menstrual problem. Negative for dysuria.   Musculoskeletal:  Negative for back pain.   Skin:  Negative for rash.   Neurological:  Positive for light-headedness. Negative for weakness.   Hematological:  Does not  bruise/bleed easily.   All other systems reviewed and are negative.    Physical Exam     Initial Vitals [10/18/22 1421]   BP Pulse Resp Temp SpO2   (!) 140/74 95 18 98.4 °F (36.9 °C) 98 %      MAP       --         Physical Exam    Nursing note and vitals reviewed.  Constitutional: She appears well-developed and well-nourished. She is not diaphoretic. No distress.   HENT:   Head: Normocephalic and atraumatic.   Eyes: Conjunctivae and EOM are normal. Pupils are equal, round, and reactive to light.   Neck: Neck supple.   Normal range of motion.  Cardiovascular:  Normal rate, regular rhythm, normal heart sounds and intact distal pulses.     Exam reveals no gallop and no friction rub.       No murmur heard.  Pulmonary/Chest: Breath sounds normal. No stridor. No respiratory distress. She has no wheezes. She has no rhonchi. She has no rales. She exhibits no tenderness.   Abdominal: Abdomen is soft. Bowel sounds are normal. She exhibits no distension and no mass. There is no abdominal tenderness. There is no rebound and no guarding.   Genitourinary:    Genitourinary Comments: Declined pelvic exam     Musculoskeletal:         General: No tenderness or edema. Normal range of motion.      Cervical back: Normal range of motion and neck supple.     Neurological: She is alert and oriented to person, place, and time.   Normal gait in the ED.  No focal deficits noted on gross exam.   Skin: Skin is warm and dry.   Psychiatric: She has a normal mood and affect. Her behavior is normal. Thought content normal.       ED Course   Procedures  Labs Reviewed   CBC W/ AUTO DIFFERENTIAL - Abnormal; Notable for the following components:       Result Value    Hemoglobin 8.5 (*)     Hematocrit 31.7 (*)     MCV 72 (*)     MCH 19.4 (*)     MCHC 26.8 (*)     RDW 19.3 (*)     Platelets 484 (*)     Gran % 76.7 (*)     Lymph % 16.9 (*)     All other components within normal limits   COMPREHENSIVE METABOLIC PANEL - Abnormal; Notable for the following  components:    CO2 20 (*)     ALT 9 (*)     All other components within normal limits   URINALYSIS, REFLEX TO URINE CULTURE - Abnormal; Notable for the following components:    Occult Blood UA Trace (*)     All other components within normal limits    Narrative:     Specimen Source->Urine   PROTIME-INR   APTT   POCT URINE PREGNANCY          Imaging Results    None          Medications - No data to display  Medical Decision Making:   Initial Assessment:   46-year-old  female with a history of fibroids and heavy menstrual cycles who presents to the ED with reports of increased heaviness or menstrual cycle over the last few days with associated lightheadedness.    Differential Diagnosis:   Includes but not limited to menorrhagia related to fibroids versus coagulopathy versus other metabolic derangement versus symptomatic anemia.  ED Management:  Patient declining pelvic exam.  Will get labs.  Will reassess.  Disposition pending results.    4:28 PM 10/18/2022  Laverne Moore MD    Update note:  Patient resting in bed comfortably.  With further questioning, patient denies any chest pain or shortness of breath.  She states she just feels fatigued and is having heavy cycle.  Patient workup shows H&H of 8.5/31.7.  Transfusion not indicated at this time.  Coags unremarkable.  Discussed plan for follow-up with gynecology.  Will discharge with gynecology follow-up and return precautions.  Patient verbalized understanding agrees plan of care.    6:34 PM 10/18/2022  Laveren Moore MD            DISCLAIMER: This note was prepared with [employer] voice recognition transcription software. Garbled syntax, mangled pronouns, and other bizarre constructions may be attributed to that software system                             Clinical Impression:   Final diagnoses:  [N92.0] Menorrhagia with regular cycle (Primary)      ED Disposition Condition    Discharge Stable          ED Prescriptions    None       Follow-up  Information       Follow up With Specialties Details Why Contact Info Additional Information    [facility] - Emergency Dept Emergency Medicine  As needed, If symptoms worsen [address]  [facility] [id number] [phone]     [facility] - OB GYN Obstetrics and Gynecology Schedule an appointment as soon as possible for a visit in 1 day  [address] [facility] [id number] [phone] OB GYN - [facility], 4th Floor, [address] Please park in [address] and use [place] elevators    Toña Garcia MD Obstetrics, Obstetrics and Gynecology Schedule an appointment as soon as possible for a visit in 1 day  [address] [facility] [address]  [facility] [address]  [phone]                Sherrie Moore MD  10/18/22 [id number]

## 2022-10-18 NOTE — FIRST PROVIDER EVALUATION
Emergency Department TeleTriage Encounter Note      CHIEF COMPLAINT    Chief Complaint   Patient presents with    Vaginal Bleeding     Pt c.o vaginal bleeding onset yesterday. Pt started her cycle yesterday.  Pt had hx of fibroid. Pt states bleeding increased this AM. Pt states she is going through a tampon and pad. Pt states she went through a tampon before she even got pad on.  AAO x 3 nadn skin w.d  conjunctiva pink  pt states she feels lightheaded        VITAL SIGNS   Initial Vitals [10/18/22 1421]   BP Pulse Resp Temp SpO2   (!) 140/74 95 18 98.4 °F (36.9 °C) 98 %      MAP       --            ALLERGIES    Review of patient's allergies indicates:   Allergen Reactions    Shellfish containing products Anaphylaxis       PROVIDER TRIAGE NOTE  TeleTriage Note: Nadeen Lindsey, a nontoxic/well appearing, 46 y.o. female, presented to the ED with c/o increased vaginal bleeding that began today. Each cycle is getting worse. Hx of fibroids. Pt is now experiencing dizziness.    All ED beds are full at present; patient notified of this status.  Patient seen and medically screened by Nurse Practitioner via teletriage. Orders initiated at triage to expedite care.  Patient is stable to return to the waiting room and will be placed in an ED bed when available.  Care will be transferred to an alternate provider when patient has been placed in an Exam Room from the [facility] for physical exam, additional orders, and disposition.  2:25 PM Angela Musa DNP, FNP-C        ORDERS  Labs Reviewed   CBC W/ AUTO DIFFERENTIAL   COMPREHENSIVE METABOLIC PANEL   POCT URINE PREGNANCY       ED Orders (720h ago, onward)      Start Ordered     Status Ordering Provider    10/18/22 1428 10/18/22 1427  Insert peripheral IV  Continuous         Ordered ANGELA MUSA    10/18/22 1428 10/18/22 1427  CBC auto differential  STAT         Ordered ANGELA MUSA    10/18/22 1428 10/18/22 1427  Comprehensive metabolic panel  STAT         Ordered  JOSIANE BRUNO    10/18/22 1428 10/18/22 1427  POCT urine pregnancy  Once         Ordered JOSIANE BRUNO              Virtual Visit Note: The provider triage portion of this emergency department evaluation and documentation was performed via [employer], a HIPAA-compliant telemedicine application, in concert with a tele-presenter in the room. A face to face patient evaluation with one of my colleagues will occur once the patient is placed in an emergency department room.      DISCLAIMER: This note was prepared with [employer] voice recognition transcription software. Garbled syntax, mangled pronouns, and other bizarre constructions may be attributed to that software system.